# Patient Record
Sex: FEMALE | Race: WHITE | NOT HISPANIC OR LATINO | URBAN - METROPOLITAN AREA
[De-identification: names, ages, dates, MRNs, and addresses within clinical notes are randomized per-mention and may not be internally consistent; named-entity substitution may affect disease eponyms.]

---

## 2017-12-10 ENCOUNTER — GENERIC CONVERSION - ENCOUNTER (OUTPATIENT)
Dept: OTHER | Facility: OTHER | Age: 62
End: 2017-12-10

## 2018-01-24 VITALS
SYSTOLIC BLOOD PRESSURE: 142 MMHG | WEIGHT: 141.6 LBS | HEIGHT: 62 IN | RESPIRATION RATE: 18 BRPM | HEART RATE: 82 BPM | TEMPERATURE: 98.3 F | BODY MASS INDEX: 26.06 KG/M2 | DIASTOLIC BLOOD PRESSURE: 62 MMHG | OXYGEN SATURATION: 98 %

## 2018-08-13 ENCOUNTER — OFFICE VISIT (OUTPATIENT)
Dept: URGENT CARE | Facility: CLINIC | Age: 63
End: 2018-08-13
Payer: COMMERCIAL

## 2018-08-13 VITALS
BODY MASS INDEX: 26.57 KG/M2 | RESPIRATION RATE: 16 BRPM | OXYGEN SATURATION: 98 % | WEIGHT: 144.4 LBS | HEIGHT: 62 IN | SYSTOLIC BLOOD PRESSURE: 98 MMHG | DIASTOLIC BLOOD PRESSURE: 50 MMHG | HEART RATE: 73 BPM | TEMPERATURE: 98.3 F

## 2018-08-13 DIAGNOSIS — J06.9 URI WITH COUGH AND CONGESTION: Primary | ICD-10-CM

## 2018-08-13 PROCEDURE — 99213 OFFICE O/P EST LOW 20 MIN: CPT | Performed by: PHYSICIAN ASSISTANT

## 2018-08-13 RX ORDER — ASPIRIN 81 MG/1
TABLET ORAL
COMMUNITY

## 2018-08-13 RX ORDER — AZITHROMYCIN 250 MG/1
TABLET, FILM COATED ORAL
Qty: 6 TABLET | Refills: 0 | Status: SHIPPED | OUTPATIENT
Start: 2018-08-13 | End: 2018-08-17

## 2018-08-13 RX ORDER — LISINOPRIL 20 MG/1
TABLET ORAL
COMMUNITY
End: 2021-09-28 | Stop reason: DRUGHIGH

## 2018-08-13 NOTE — PATIENT INSTRUCTIONS
Take antibiotic as directed with food and water  While on this medication begin a probiotic such as yogurt  Continue Mucinex  Do a salt water gargle, warm tea with honey, and throat lozenges for throat relief  Use a cool mist humidifier at bedtime, turning on hours prior to bed with doors shut for maximum relief  Follow up with your family doctor in 3-5 days  Proceed to the ER if symptoms worsen

## 2018-08-13 NOTE — PROGRESS NOTES
[facility]    NAME: Justo Edward is a 61 y o  female  : 1955    MRN: [id number]  DATE: 2018  TIME: 4:27 PM    Assessment and Plan   URI with cough and congestion [J06 9]  1  URI with cough and congestion  azithromycin (ZITHROMAX) 250 mg tablet     Patient Instructions   Take antibiotic as directed with food and water  While on this medication begin a probiotic such as yogurt  Continue Mucinex  Do a salt water gargle, warm tea with honey, and throat lozenges for throat relief  Use a cool mist humidifier at bedtime, turning on hours prior to bed with doors shut for maximum relief  Follow up with your family doctor in 3-5 days  Proceed to the ER if symptoms worsen  Chief Complaint     Chief Complaint   Patient presents with    Cold Like Symptoms     sore throat, runny nose since Thursday     History of Present Illness       60 y/o female with c/o nasal congestion x 4 days  Sx associated with sore throat and an intermittently productive cough  Cough is worse in the am, and becomes more dry as day persists  She notes shortness of breath with prolonged activity, relieved with rest  She is treating with Mucinex and a liquid sore throat reliever with unknown name noting minimal relief with these treatments  No sick contacts or recent travel  No GI sx  Review of Systems   Review of Systems   Constitutional: Positive for chills and fatigue  Negative for diaphoresis and fever  HENT: Negative for ear pain  Respiratory: Positive for shortness of breath (STEWART, resolves with rest)  Negative for cough and wheezing  Cardiovascular: Negative for chest pain and palpitations  Gastrointestinal: Negative for abdominal pain, diarrhea, nausea and vomiting  Musculoskeletal: Negative for arthralgias, joint swelling and myalgias  Skin: Negative for color change and rash  Neurological: Negative for weakness, numbness and headaches         Current Medications       Current Outpatient Prescriptions:     aspirin (aspirin) 81 mg EC tablet, Take by mouth, Disp: , Rfl:     lisinopril (ZESTRIL) 20 mg tablet, Take by mouth, Disp: , Rfl:     azithromycin (ZITHROMAX) 250 mg tablet, Take two tablets on day one, then one tablet daily  , Disp: 6 tablet, Rfl: 0    Current Allergies     Allergies as of 08/13/2018    (Not on File)            The following portions of the patient's history were reviewed and updated as appropriate: allergies, current medications, past family history, past medical history, past social history, past surgical history and problem list      Past Medical History:   Diagnosis Date    GERD (gastroesophageal reflux disease)     Hypertension        Past Surgical History:   Procedure Laterality Date    CHOLECYSTECTOMY         No family history on file  Medications have been verified  Objective   BP 98/50   Pulse 73   Temp 98 3 °F (36 8 °C)   Resp 16   Ht 5' 1 5" (1 562 m)   Wt 65 5 kg (144 lb 6 4 oz)   SpO2 98%   BMI 26 84 kg/m²        Physical Exam     Physical Exam   Constitutional: She is oriented to person, place, and time  She appears well-developed and well-nourished  No distress  HENT:   Head: Normocephalic and atraumatic  Right Ear: Tympanic membrane, external ear and ear canal normal    Left Ear: Tympanic membrane, external ear and ear canal normal    Nose: Mucosal edema and rhinorrhea present  Mouth/Throat: Uvula is midline and mucous membranes are normal  Mucous membranes are not pale and not dry  Posterior oropharyngeal erythema (mildly injected) present  No oropharyngeal exudate or posterior oropharyngeal edema  Eyes: Conjunctivae are normal    Neck: Neck supple  Cardiovascular: Normal rate, regular rhythm and normal heart sounds  Pulmonary/Chest: Effort normal and breath sounds normal  No respiratory distress  She has no decreased breath sounds  She has no wheezes  She has no rhonchi  She has no rales     Lymphadenopathy:     She has cervical adenopathy  Neurological: She is alert and oriented to person, place, and time  No cranial nerve deficit  She exhibits normal muscle tone  Coordination normal    Skin: Skin is warm and dry  No rash noted  She is not diaphoretic  Psychiatric: She has a normal mood and affect  Her behavior is normal    Nursing note and vitals reviewed

## 2021-09-28 ENCOUNTER — APPOINTMENT (OUTPATIENT)
Dept: RADIOLOGY | Facility: CLINIC | Age: 66
End: 2021-09-28
Payer: COMMERCIAL

## 2021-09-28 ENCOUNTER — OFFICE VISIT (OUTPATIENT)
Dept: URGENT CARE | Facility: CLINIC | Age: 66
End: 2021-09-28
Payer: COMMERCIAL

## 2021-09-28 VITALS
SYSTOLIC BLOOD PRESSURE: 138 MMHG | HEART RATE: 78 BPM | DIASTOLIC BLOOD PRESSURE: 72 MMHG | OXYGEN SATURATION: 100 % | TEMPERATURE: 97.2 F | RESPIRATION RATE: 16 BRPM

## 2021-09-28 DIAGNOSIS — S16.1XXA STRAIN OF MUSCLE, FASCIA AND TENDON AT NECK LEVEL, INITIAL ENCOUNTER: Primary | ICD-10-CM

## 2021-09-28 DIAGNOSIS — S16.1XXA STRAIN OF MUSCLE, FASCIA AND TENDON AT NECK LEVEL, INITIAL ENCOUNTER: ICD-10-CM

## 2021-09-28 PROBLEM — K57.32 DIVERTICULITIS OF COLON: Status: ACTIVE | Noted: 2017-12-10

## 2021-09-28 PROCEDURE — 72050 X-RAY EXAM NECK SPINE 4/5VWS: CPT

## 2021-09-28 PROCEDURE — 99213 OFFICE O/P EST LOW 20 MIN: CPT | Performed by: FAMILY MEDICINE

## 2021-09-28 RX ORDER — CYCLOBENZAPRINE HCL 10 MG
10 TABLET ORAL
Qty: 7 TABLET | Refills: 0 | Status: SHIPPED | OUTPATIENT
Start: 2021-09-28

## 2021-09-28 RX ORDER — FENOFIBRATE 145 MG/1
145 TABLET, COATED ORAL DAILY
COMMUNITY
Start: 2021-08-30

## 2021-09-28 RX ORDER — LISINOPRIL 10 MG/1
10 TABLET ORAL DAILY
COMMUNITY
Start: 2021-08-30

## 2021-09-28 RX ORDER — METHOTREXATE 2.5 MG/1
TABLET ORAL
COMMUNITY
Start: 2021-09-07

## 2021-09-28 RX ORDER — FOLIC ACID 1 MG/1
1000 TABLET ORAL DAILY
COMMUNITY
Start: 2021-09-07

## 2021-09-28 RX ORDER — PREDNISONE 1 MG/1
TABLET ORAL
COMMUNITY
Start: 2021-09-07

## 2021-09-28 NOTE — PROGRESS NOTES
[facility]        NAME: Alexis Liner is a 77 y o  female  : 1955    MRN: [id number]  DATE: 2021  TIME: 8:18 PM    Assessment and Plan   Strain of muscle, fascia and tendon at neck level, initial encounter [S16  1XXA]  1  Strain of muscle, fascia and tendon at neck level, initial encounter  XR spine cervical complete 4 or 5 vw non injury    cyclobenzaprine (FLEXERIL) 10 mg tablet         Patient Instructions     Patient Instructions   1  Neck strain   - cervical spine xray performed in the office shows no apparent abnormalities, awaiting official radiology read   - rest and apply a heating pad to the neck  - take Tylenol as needed for pain   - advised to avoid NSAIDs due to interaction with Methotrexate   - Flexeril prescribed to help w/ muscle stiffness, to be used as directed, side effects discussed, not be taken prior to driving or operating machinery as it does cause drowsiness, advised to take at bedtime   - may use a muscle rub such as Bengay or Icy Hot   - advised gentle massage and stretches   - follow up w/ PCP for re-check in 3-5 days   - if symptoms persist despite treatment, worsen, or any new symptoms present, should be seen in the ER  Follow up with PCP in 3-5 days  Proceed to  ER if symptoms worsen  Chief Complaint     Chief Complaint   Patient presents with    Neck Pain     right side neck pain, no injury         History of Present Illness       78 yo female presents c/o right sided neck pain for the past 1-2 weeks  She denies any injury to the neck  No recent falls or MVAs  She states the neck muscles feel stiff and she feels pain and stiffness when she tries to move her neck  No pain radiating into the arms  No numbness/tingling or weakness of the arms  No back pain  No chest pain or SOB  No headache or dizziness  Her neck ROM is limited due to the pain  She denies any history of previous injury or pre-existing conditions of the neck   She states she has been taking aleve as needed for the pain, and she went to a chiropractor yesterday who did adjustments on her however she states the neck muscles feel more sore post the adjustment today  She has a history of RA for which she is on Prednisone and Methotrexate  Review of Systems   Review of Systems   Constitutional: Negative  HENT: Negative  Eyes: Negative  Respiratory: Negative  Cardiovascular: Negative  Gastrointestinal: Negative  Musculoskeletal: Positive for neck pain and neck stiffness  As noted in HPI   Skin: Negative  Allergic/Immunologic: Negative  Neurological: Negative  Hematological: Negative            Current Medications       Current Outpatient Medications:     aspirin (aspirin) 81 mg EC tablet, Take by mouth, Disp: , Rfl:     fenofibrate (TRICOR) 145 mg tablet, Take 145 mg by mouth daily, Disp: , Rfl:     folic acid (FOLVITE) 1 mg tablet, Take 1,000 mcg by mouth daily, Disp: , Rfl:     lisinopril (ZESTRIL) 10 mg tablet, Take 10 mg by mouth daily, Disp: , Rfl:     methotrexate 2 5 MG tablet, TAKE 4 TABLETS BY MOUTH ONCE A WEEK, Disp: , Rfl:     predniSONE 5 mg tablet, TAKE 3 TABLETS BY MOUTH ONCE DAILY FOR 7 DAYS THEN TAKE 2 TABLET ONCE DAILY FOR 7 DAYS THEN TAKE 1 TABLET ONCE DAILY FOR 7 DAYS, Disp: , Rfl:     cyclobenzaprine (FLEXERIL) 10 mg tablet, Take 1 tablet (10 mg total) by mouth daily at bedtime as needed for muscle spasms, Disp: 7 tablet, Rfl: 0    Current Allergies     Allergies as of 09/28/2021    (No Known Allergies)            The following portions of the patient's history were reviewed and updated as appropriate: allergies, current medications, past family history, past medical history, past social history, past surgical history and problem list      Past Medical History:   Diagnosis Date    GERD (gastroesophageal reflux disease)     Hypertension     RA (rheumatoid arthritis) ([facility] [address] )        Past Surgical History:   Procedure Laterality Date  CHOLECYSTECTOMY         History reviewed  No pertinent family history  Medications have been verified  Objective   /72   Pulse 78   Temp (!) 97 2 °F (36 2 °C)   Resp 16   SpO2 100%   No LMP recorded  Patient is postmenopausal        Physical Exam     Physical Exam  Vitals and nursing note reviewed  Constitutional:       General: She is awake  She is not in acute distress  Appearance: Normal appearance  She is well-developed, well-groomed and normal weight  She is not ill-appearing, toxic-appearing or diaphoretic  HENT:      Head: Normocephalic and atraumatic  Jaw: There is normal jaw occlusion  Right Ear: Tympanic membrane, ear canal and external ear normal       Left Ear: Tympanic membrane, ear canal and external ear normal       Nose: Nose normal       Mouth/Throat:      Lips: Pink  Mouth: Mucous membranes are moist       Pharynx: Oropharynx is clear  Uvula midline  Eyes:      General: Lids are normal  Vision grossly intact  Gaze aligned appropriately  Extraocular Movements: Extraocular movements intact  Conjunctiva/sclera: Conjunctivae normal       Pupils: Pupils are equal, round, and reactive to light  Neck:      Trachea: Trachea and phonation normal       Comments: Neck: no swelling, erythema, bruising, or rashes  No cervical spinal tenderness  There is tenderness to palpation of the right sided cervical paraspinal muscles  Neck ROM is limited secondary to muscle pain and stiffness  Cardiovascular:      Rate and Rhythm: Normal rate and regular rhythm  Pulses: Normal pulses  Heart sounds: Normal heart sounds  Pulmonary:      Effort: Pulmonary effort is normal  No tachypnea, accessory muscle usage or respiratory distress  Breath sounds: Normal breath sounds and air entry  Musculoskeletal:      Cervical back: Neck supple  Tenderness present  No edema, erythema, signs of trauma or rigidity   Pain with movement and muscular tenderness present  No spinous process tenderness  Decreased range of motion  Comments: As noted in neck exam    Skin:     General: Skin is warm and dry  Coloration: Skin is not pale  Findings: No abrasion, bruising, ecchymosis, erythema, signs of injury, rash or wound  Neurological:      General: No focal deficit present  Mental Status: She is alert and oriented to person, place, and time  Mental status is at baseline  Psychiatric:         Attention and Perception: Attention and perception normal          Mood and Affect: Mood and affect normal          Speech: Speech normal          Behavior: Behavior normal  Behavior is cooperative  Thought Content:  Thought content normal          Cognition and Memory: Cognition and memory normal          Judgment: Judgment normal

## 2021-09-28 NOTE — PATIENT INSTRUCTIONS
1  Neck strain   - cervical spine xray performed in the office shows no apparent abnormalities, awaiting official radiology read   - rest and apply a heating pad to the neck  - take Tylenol as needed for pain   - advised to avoid NSAIDs due to interaction with Methotrexate   - Flexeril prescribed to help w/ muscle stiffness, to be used as directed, side effects discussed, not be taken prior to driving or operating machinery as it does cause drowsiness, advised to take at bedtime   - may use a muscle rub such as Bengay or Icy Hot   - advised gentle massage and stretches   - follow up w/ PCP for re-check in 3-5 days   - if symptoms persist despite treatment, worsen, or any new symptoms present, should be seen in the ER